# Patient Record
(demographics unavailable — no encounter records)

---

## 2024-12-09 NOTE — DISCUSSION/SUMMARY
[de-identified] : At this time, I recommended ice and elevation.  The patient was advised to return back to the office as needed for the right knee osteoarthritis.  We did talk about gel injections, so we might order them when she gives us a call and when the cortisone starts wearing off.

## 2024-12-09 NOTE — PHYSICAL EXAM
[de-identified] : Right Knee: Range of Motion in Degrees                     Claimant: Normal:  Flexion Active   135                  135-degrees  Flexion Passive   135                 135-degrees  Extension Active   0-5                 0-5-degrees  Extension Passive   0-5                 0-5-degrees    No weakness to flexion/extension. No evidence of instability in the AP plane or varus or valgus stress.  Negative Lachman.  Negative pivot shift.  Negative anterior drawer test.  Negative posterior drawer test.  Negative Fide.  Negative Apley grind.  No medial or lateral joint line tenderness.  Positive tenderness over the medial and lateral facet of the patella.  Positive patellofemoral crepitations.  No lateral tilting patella.  No patella apprehension.  Positive crepitation in the medial and lateral femoral condyle.  No proximal or distal swelling, edema or tenderness.  No gross motor or sensory deficits. Mild intra-articular swelling.  2+ DP and PT pulses. No varus or valgus malalignment.    [de-identified] : Gait and Station:  Ambulating with a slightly antalgic to antalgic gait.  Normal Station.  [de-identified] : Appearance:  Well-developed, well-nourished female in no acute distress.   [de-identified] : Radiographs, two views of the right knee taken in the office today, reveal moderate osteoarthritis.

## 2024-12-09 NOTE — PROCEDURE
[de-identified] :   Indication: Osteoarthritis of right knee   Consent: At this time, I have recommended an injection to the right knee.  The risks and benefits of the procedure were discussed with the patient in detail.  Upon verbal consent of the patient, we proceeded with the injection as noted below.   Description of Procedure: After a sterile prep, the patient underwent an injection of approximately 9 mL of 1% Lidocaine (10 mg/mL) without epinephrine and 1 mL of triamcinolone acetonide (40 mg/mL) into the right knee.  The patient tolerated the procedure well.  There were no complications.   : [employer] Drug Name: Triamcinolone Acetonide Injectable Suspension USP NDC#: [address]-8 Lot#:  [id number] Expiration Date: 08/31/25

## 2024-12-09 NOTE — ADDENDUM
[FreeTextEntry1] : This note was written by Daniel Savage on 12/09/2024, acting as a scribe for RICHARD CINTRON, BHASKAR/L, PA

## 2025-01-28 NOTE — HISTORY OF PRESENT ILLNESS
[FreeTextEntry1] : 58-year-old woman seen 03/15/2024 with complaint of abnormal odor and sediment in urine. She says she see crystals as well. She did not have storage symptoms or dysuria at the time. None now. She had UA (2/17/2024) bacteria, 3 WBCs, 1 cast but no other abnormalities, UCx showed low CFU enterococcus and Ecoli. PCP gave Rx for CIpro 250 mg, but no change in symptoms.  No hematuria, no dysuria, no frequency, no urgency, no hesitancy, no straining. No incontinence.  No fevers, no chills, no nausea, no vomiting, no flank pain. PVR 93 mL.  01/28/25: Patient presents for follow up. Reports she still has a large quantity of sediment/cloudy foul-smelling urine. Last CT (11/26/24) showed no stones or hydronephrosis. Cystoscopy (11/14/24) was negative. Kidney stone Urine Test (08/28/24) showed slightly elevated urine oxalate and calcium levels. She denies dysuria or recent UTI. Denies dysuria, hematuria, lower abdominal or flank pain, fever, chills or rigors. Denies hesitancy, straining, intermittency, urgency, incontinence or sense of incomplete emptying.

## 2025-01-28 NOTE — ASSESSMENT
[FreeTextEntry1] : 59-year-old female with crystalluria and microscopic hematuria. s/p negative hematuria workup 11/2024. Patient is without UTI symptoms. Will repeat urine studies. We discussed crystalluria does predict the development of macroscopic stone disease. For now, we discussed dietary medications for stone prevention. Patient was instructed to increase fluid intake to a urine output of 2L daily, reduce oxalate foods, and to reduce sodium intake.  Patient will RTO in 1 year or sooner if needed.

## 2025-02-13 NOTE — REVIEW OF SYSTEMS
[Muscle Pain] : muscle pain [Muscle Weakness] : muscle weakness [Difficulty Walking] : difficulty walking [Fever] : no fever [Chills] : no chills [Joint Pain] : no joint pain [Joint Stiffness] : no joint stiffness

## 2025-02-13 NOTE — PHYSICAL EXAM
[FreeTextEntry1] : Constitutional: In NAD, calm and cooperative HEENT: NCAT, Anicteric sclera, no lid-lag Cardio: Extremities appear pink and well perfused, no peripheral edema Respiratory: Normal respiratory effort on room air, no accessory muscle use Skin: no rashes seen on exposed skin, no visible abrasions Psych: Normal affect, intact judgment and insight Neuro: Awake, alert and oriented x 3, see below for focused neurological exam   MSK (Back)                 Inspection: muscle spasms of lumbar paraspinals on the left. Left quadricep spasm.                  Palpation: Tenderness of the left lower lumbar paraspinals                 ROM: Pain at end lumbar flexion > extension                 Strength: 5/5 strength in bilateral lower extremities except for 4+/5 left ankle dorsiflexion                 Reflexes: 2+ Patella reflex bilaterally, 1+ Achilles reflex bilaterally, negative clonus bilaterally                 Sensation: Intact to light touch in bilateral lower extremities                 Gait: antalgic gait. Able to toe walk. Unable to heel walk on the left   Special tests: SLR: positive on the left, reproduced radiating pain along the L4 and L5 dermatomes KRIS: negative bilaterally  FADIR: negative bilaterally  Facet loading: negative bilaterally  Thad's Finger: negative bilaterally

## 2025-02-13 NOTE — DATA REVIEWED
[Other: ___] : [unfilled] [Plain X-Rays] : plain X-Rays [FreeTextEntry1] : Confidential Drug Utilization Report Search Terms: Ida Johnson, 03/21/1965Search Date: 02/13/2025 10:15:22 AM The Drug Utilization Report below displays all of the controlled substance prescriptions, if any, that your patient has filled in the last twelve months. The information displayed on this report is compiled from pharmacy submissions to the Department, and accurately reflects the information as submitted by the pharmacies.  This report was requested by: Domingo Alford | Reference #: [id number]  Practitioner Count: 3 Pharmacy Count: 2 Current Opioid Prescriptions: 1 Current Benzodiazepine Prescriptions: 0 Current Stimulant Prescriptions: 1   Patient Demographic Information (PDI)     PDI	First Name	Last Name	Birth Date	Gender	Street Address	[facility] Code A	Ida Johnson	03/21/1965	Female	[address] 	[place] [address] B	Ida Johnson	03/21/1965	Female	[address] 	[facility]	[id number]  Prescription Information    PDI Filter:   PDI	My Rx	Current Rx	Drug Type	Rx Written	Rx Dispensed	Drug	Quantity	Days Supply	Prescriber Name	Prescriber FELISA # A	N	Y	O	01/30/2025	02/05/2025	buprenorphine-naloxone 2-0.5 mg sl film	45	15	Oneida Anton	[id number] Payment Method Medicaid [id number] A	N	N	O	12/19/2024	12/23/2024	buprenorphine-naloxone 4-1 mg sl film	30	30	Oneida Anton	[id number] Payment Method Medicaid Dispenser [id number] A	N	N	O	11/30/2024	11/30/2024	buprenorphine-naloxone 2-0.5 mg sl film	11	7	Tatiana Smith MD	[id number] Payment Method Medicaid Dispenser [id number] A	N	N	O	10/28/2024	10/28/2024	buprenorphine-naloxone 2-0.5 mg sl film	11	7	Oneida Anton	[id number] Payment Method Medicaid Dispenser [id number] A	N	N	B	09/26/2024	09/28/2024	diazepam 5 mg tablet	1	1	Santy Marcos MD	[id number] Payment Method Insurance [id number] A	N	N	O	09/26/2024	09/28/2024	buprenorphine-naloxone 2-0.5 mg sl film	45	30	Oneida Anton	[id number] Payment Method Insurance [id number] A	N	N	O	08/15/2024	08/26/2024	oxycodone-acetaminophen 5-325 mg tab	40	6	Grosskopf, Angie	[id number] Payment Method Medicaid [id number] A	N	N	O	07/19/2024	07/21/2024	buprenorphine-naloxone 4-1 mg sl film	15	10	[email or web address]	[id number] Payment Method Medicaid [id number] A	N	N	O	06/12/2024	06/19/2024	buprenorphine-naloxone 4-1 mg sl film	45	30	Oneida Anton	[id number] Payment Method Medicaid [id number] A	N	N	O	04/04/2024	04/11/2024	buprenorphine-naloxone 4-1 mg sl film	45	30	Oneida Anton	[id number] Payment Method Medicaid [id number] A	N	N	O	03/07/2024	03/09/2024	buprenorphine-naloxone 4-1 mg sl film	45	30	Jose Neal MD	[id number] Payment Method Medicaid [id number] A	N	N	O	02/19/2024	03/03/2024	oxycodone-acetaminophen 5-325 mg tab	40	6	Grosskopf, Angie	[id number] Payment Method Medicaid [id number] B	N	Y	S	02/08/2025	02/08/2025	phentermine 37.5 mg tablet	30	30	[facility]	[id number] Payment Method Cash Dispenser [facility] B	N	N	S	12/28/2024	12/28/2024	phentermine 37.5 mg tablet	30	30	[facility]	[id number] Payment Method Cash Dispenser [facility] B	N	N	S	05/21/2024	05/21/2024	phentermine 37.5 mg tablet	30	30	[facility]	[id number] Payment Method Cash Dispenser [facility] B	N	N	S	04/12/2024	04/12/2024	phentermine 37.5 mg tablet	30	30	Dahlia Fraser	[id number] Payment Method [place] [facility] CameronGilberto Dahlia B	N	N	S	02/20/2024	02/20/2024	phentermine 37.5 mg tablet	30	30	Hali, Starr LOUIE	[id number] Payment Method Cash Dispenser Hali Starr LOUIE  * - Details of Drug Type : O = Opioid, B = Benzodiazepine, S = Stimulant  * - Drugs marked with an asterisk are compound drugs. If the compound drug is made up of more than one controlled substance, then each controlled substance will be a separate row in the table.   **************** EXAM: XR HIP WITH PELV 2-3V LT  PROCEDURE DATE: 02/11/2025  INTERPRETATION: EXAM: XR HIP WITH PELVIS 2 OR 3 VIEWS LEFT INDICATION: Left lateral hip pain JXR COMPARISON: None  IMPRESSION: No acute fracture or dislocation. No significant arthritic changes. Follow-up suggested if indicated clinically. There may be some degenerative changes lower lumbar spine  --- End of Report ---  I personally reviewed the images for my patient and made my own independent interpretation separate from the official radiologist's report. My impression: mild degenerative changes seen on both hips. Possible degenerative changes seen on the lower lumbar and lumbosacral spine, however difficult to ascertain due to positioning

## 2025-02-13 NOTE — ASSESSMENT
[FreeTextEntry1] : NEAL SOLIS is a 59 year old female with a pertinent past medical history of NELLI on Suboxone, right knee OA s/p arthroscopy and meniscal repair who presents today for an evaluation of acute left leg pain.  The patient presents with acute left radiating leg pain of neuropathic and radicular in nature. It started about a week ago, after doing her usual physical activity/work out.. Went to ED was given Rx for ibuprofen and robaxin 2 days ago. Pain has been persistent since then    Their pain was refractory to conservative management with ibuprofen and muscle relaxants, stretching, ice/heat   Physical exam was notable for lumbar muscle spasms on the left, positive straight leg raise on the left along the L4 and L5 distribution, and left ankle dorsiflexion weakness.   Imaging studies shows mild degenerative changes seen on both hips. Possible degenerative changes seen on the lower lumbar and lumbosacral spine, however difficult to ascertain due to positioning. No Lumbosacral imaging were done in the ED.   Based on the patient's history, presentation, physical exam, and imaging findings, the patient's pain is most likely due to acute lumbar strain and acute lumbar radiculitis.     Plan: - Medication: >> Start Medrol dose pack as prescribed. >> Continue Robaxin 750mg but only take 1 tablet 4 times daily instead of 2 tablets 4 times daily as patient reported sedation on high dose of Robaxin. >> After finishing course of medrol, start Meloxicam 15mg po once daily with food for 4 weeks and then as needed thereafter. The patient is instructed to not take other NSAIDs such as ibuprofen, or naproxen while on this medication. >> Continue suboxone as prescribed by her NELLI treatment team.  >>Risk / benefits / alternatives discussed with patient.   - Physical Therapy: >> Patient may benefit from PT and supervised HEP, but will defer until her acute pain improves.   - Imaging: >> Will order Lumbar XR w/ Flex/Ext films to evaluate for potential source of lumbar radicular pain. >> Will order MRI lumbar spine to assess for evidence of lumbar disc herniation, central canal, and neuroforaminal stenosis resulting in left lumbar radiculopathy. MRI will be crucial for understanding potential restrictions for PT script as well as procedural planning if necessary.  - Red Flags: Patient is educated on the potential red flags for her condition, and to promptly go to the emergency room for an evaluation if present. They express understanding.   - Follow up in 2 weeks to monitor progress.

## 2025-02-13 NOTE — HISTORY OF PRESENT ILLNESS
[FreeTextEntry1] : NEAL SOLIS is a 59 year old female with a pertinent past medical history of NELLI on Suboxone, right knee OA s/p arthroscopy and meniscal repair who presents today for an evaluation of left leg pain.   Location: Left hip and buttock, radiates down all to the dorsal foot Onset: started acutely about a week ago, after doing her usual physical activity/work out.. Went to ED was given Rx for ibuprofen and robaxin Provocation/Palliative: worse with bending forward, although all activity in general makes it worse. Better when laying down flat on her foot Quality: burning, shooting, pins and needle Radiation: down the buttock to the dorsal left foot Severity: 8/10 currently, 10/10 at its worse Timing: constant Associated Sxs:   Denies associated numbness or tingling. Endorses left ankle weakness. Denies any loss of bowel/bladder control or any groin numbness.   Previous medications trialed: buprenorphine-naloxone 2-0.5 mg sl film #45 films  Current pain medications: ibuprofen, robaxin, and suboxone taper Previous procedures relevant to complaint: denies Has tried conservative treatment?: NSAIDs, muscle relaxant, stretching, ice/heat Imaging obtained: Left hip XR from the ED Anticoagulation?: none Allergies: NKDA Substance: NELLI on Suboxone. Was in NELLI rehab. Occupation:  Function:  independent with all ADLs and IADLs, ambulates without use of assistive device.

## 2025-02-27 NOTE — END OF VISIT
[FreeTextEntry3] : The time spent for this encounter includes time involved in providing services and care, including evaluation, diagnostic testing review, physical examination, review of the care plan with the patient.  The time spent also includes educating the patient on his disease pathology, prognosis, alternative treatments, and answering any questions the patient had. This time spent is independent from any time spent performing diagnostic and/or therapeutic procedures during this visit. This time spent is also independent from any time spent by a medical student, resident, or fellow for the encounter, if present. [Time Spent: ___ minutes] : I have spent [unfilled] minutes of time on the encounter which excludes teaching and separately reported services.

## 2025-02-27 NOTE — HISTORY OF PRESENT ILLNESS
[FreeTextEntry1] : NEAL SOLIS is a 59 year old female with a pertinent past medical history of NELLI on Suboxone, right knee OA s/p arthroscopy and meniscal repair who presents today for a follow up for left leg pain.   The assessment and plan from the last visit was:  ************************************************************************************************* Chronic low back pain, unspecified back pain laterality, unspecified whether sciatica present (724.2,338.29) (M54.50,G89.29) Left lumbar radiculitis (724.4) (M54.16) Acute lumbar myofascial strain (847.2) (S39.012A)  NEAL SOLIS is a 59 year old female with a pertinent past medical history of NELLI on Suboxone, right knee OA s/p arthroscopy and meniscal repair who presents today for an evaluation of acute left leg pain.  The patient presents with acute left radiating leg pain of neuropathic and radicular in nature. It started about a week ago, after doing her usual physical activity/work out.. Went to ED was given Rx for ibuprofen and robaxin 2 days ago. Pain has been persistent since then  Their pain was refractory to conservative management with ibuprofen and muscle relaxants, stretching, ice/heat  Physical exam was notable for lumbar muscle spasms on the left, positive straight leg raise on the left along the L4 and L5 distribution, and left ankle dorsiflexion weakness.  Imaging studies shows mild degenerative changes seen on both hips. Possible degenerative changes seen on the lower lumbar and lumbosacral spine, however difficult to ascertain due to positioning. No Lumbosacral imaging were done in the ED.  Based on the patient's history, presentation, physical exam, and imaging findings, the patient's pain is most likely due to acute lumbar strain and acute lumbar radiculitis.   Plan: - Medication: >> Start Medrol dose pack as prescribed. >> Continue Robaxin 750mg but only take 1 tablet 4 times daily instead of 2 tablets 4 times daily as patient reported sedation on high dose of Robaxin. >> After finishing course of medrol, start Meloxicam 15mg po once daily with food for 4 weeks and then as needed thereafter. The patient is instructed to not take other NSAIDs such as ibuprofen, or naproxen while on this medication. >> Continue suboxone as prescribed by her [facility] treatment team. >>Risk / benefits / alternatives discussed with patient.  - Physical Therapy: >> Patient may benefit from PT and supervised HEP, but will defer until her acute pain improves.  - Imaging: >> Will order Lumbar XR w/ Flex/Ext films to evaluate for potential source of lumbar radicular pain. >> Will order MRI lumbar spine to assess for evidence of lumbar disc herniation, central canal, and neuroforaminal stenosis resulting in left lumbar radiculopathy. MRI will be crucial for understanding potential restrictions for PT script as well as procedural planning if necessary.  - Red Flags: Patient is educated on the potential red flags for her condition, and to promptly go to the emergency room for an evaluation if present. They express understanding.  - Follow up in 2 weeks to monitor progress.   - Additionally, the patient had called twice regarding her acute pain on 02/19 and 02/20. Gabapentin and another Medrol pack were provided. Robaxin was renewed.   *************************************************************************************************  Since their last visit, they have tried two rounds of Medrol dose pack, Meloxicam, and gabapentin. Her acute pain has been too severe for her to participate in any meaningful physical therapy or exercises.   Reports associated numbness or tingling of the posterior leg all the way to the ankle. Endorses left ankle weakness. Denies any loss of bowel/bladder control or any groin numbness.  Previous medications trialed: buprenorphine-naloxone 2-0.5 mg sl film #45 films,  ibuprofen, robaxin, and suboxone taper, medrol, Current pain medications: Mobic 15mg, robaxin, gabapentin 300mg tid Previous procedures relevant to complaint: denies Has tried conservative treatment?: NSAIDs, muscle relaxant, stretching, ice/heat Imaging obtained: Left hip XR from the ED New images since last visit: XR lumbar spine 4 views, AP Lat Flex Exr Anticoagulation?: none Allergies: NKDA Substance: NELLI on Suboxone. Was in [facility] rehab.

## 2025-02-27 NOTE — REVIEW OF SYSTEMS
[Joint Pain] : joint pain [Joint Stiffness] : joint stiffness [Muscle Pain] : muscle pain [Muscle Weakness] : muscle weakness [Difficulty Walking] : difficulty walking [Fever] : no fever [Chills] : no chills

## 2025-02-27 NOTE — DATA REVIEWED
[Plain X-Rays] : plain X-Rays [FreeTextEntry1] : I personally reviewed the images for my patient and made my own independent interpretation separate from the official radiologist's report.  My impression: Levoscoliosis. There is a grade 1 anterolisthesis at L4-L5 and grade 1 retrolisthesis at L3-L4. Stable on Flex/Ex films. Multilevel loss of intervertebral disc height. L4-L5 facet arthrosis

## 2025-02-27 NOTE — ASSESSMENT
[FreeTextEntry1] : NEAL SOLIS is a 59 year old female with a pertinent past medical history of NELLI on Suboxone, right knee OA s/p arthroscopy and meniscal repair who presents today for a follow up for acute low back and left leg pain.   The patient presents with 1 month history of acute low back and left leg pain that is radicular in nature, with pins and needle, electrical sensation shooting down what appears to be L4 and L5 dermatome. There is associated numbness and tingling and weakness of the lower extremity.    Their pain was refractory to conservative management with meloxicam, Medrol dose pack x2, gabapentin, Robaxin, stretching, HEP, ice/heat. She has been in too much pain to participate in PT.    Physical exam was notable for positive straight leg raise on the left along the L4 and L5 distribution, and left ankle dorsiflexion weakness. There is also decreased sensation to Light touch on L5 dermatome which is new from the initial evaluate from weeks prior.   Imaging studies shows Levoscoliosis. There is a grade 1 anterolisthesis at L4-L5 and grade 1 retrolisthesis at L3-L4. Stable on Flex/Ex films. Multilevel loss of intervertebral disc height. L4-L5 facet arthrosis  Based on the patient's history, presentation, physical exam, and imaging findings, the patient's pain is most likely due to lumbar radiculitis secondary to lumbar DDD, lumbar spondylosis, and possibley compounded by the anterolithesis at L4-5.     Plan: - Medication: >> Continue Mobic 15mg once daily, Robaxin prn spasms, and gabapentin 300mg >>Risk / benefits / alternatives discussed with patient.   - Physical Therapy: >> Continue HEP as tolerated.   - Imaging: >> Will obtain MRI of Lumbar spine without contrast given persistent symptoms despite conservative management, new sensory symptoms on presentation and examination. MRI will also be vital for procedural planning and safety.   - Procedure: >> The patient is a good candidate for LESI or TFESI, but we will need the MRI to determine the target level(s) and for procedural planning.  >> Risk, benefits, and alternatives discussed.   - Red Flags: Patient is educated on the potential red flags for her condition, and to promptly go to the emergency room for an evaluation if present. They express understanding.   - We will call the patient after reviewing MRI and discuss further.

## 2025-02-27 NOTE — PHYSICAL EXAM
[FreeTextEntry1] : Constitutional: In NAD, calm and cooperative HEENT: NCAT, Anicteric sclera, no lid-lag Cardio: Extremities appear pink and well perfused, no peripheral edema Respiratory: Normal respiratory effort on room air, no accessory muscle use Skin: no rashes seen on exposed skin, no visible abrasions Psych: Normal affect, intact judgment and insight Neuro: Awake, alert and oriented x 3, see below for focused neurological exam  MSK (Back)  Inspection: muscle spasms of lumbar paraspinals on the left. Left quadricep spasm.  Palpation: Tenderness of the left lower lumbar paraspinals  ROM: Pain at end lumbar flexion > extension  Strength: 5/5 strength in bilateral lower extremities except for 4+/5 left ankle dorsiflexion  Reflexes: 2+ Patella reflex bilaterally, 1+ Achilles reflex bilaterally, negative clonus bilaterally  Sensation: Intact to light touch in bilateral lower extremities except newer decreased sensation to light touch along the left L5 dermatomal distribution  Gait: antalgic gait. Able to toe walk. Unable to heel walk on the left  Special tests: SLR: positive on the left, reproduced radiating pain along the L4 and L5 dermatomes KRIS: negative bilaterally FADIR: negative bilaterally Facet loading: negative bilaterally Thad's Finger: negative bilaterally.

## 2025-03-05 NOTE — REASON FOR VISIT
[Medical Office: ([facility])___] : at the medical office located in  [Verbal consent obtained from patient] : the patient, [unfilled] [Initial Evaluation] : an initial evaluation [Other Location: e.g. School (Enter Location, City,State)___] : at [unfilled], at the time of the visit.

## 2025-03-05 NOTE — DATA REVIEWED
[MRI] : MRI [FreeTextEntry1] : EXAM: [id number] - MR SPINE LUMBAR  - ORDERED BY: BELLA ZAYAS   PROCEDURE DATE:  02/28/2025    INTERPRETATION:  CLINICAL INFORMATION: Low back pain  ADDITIONAL CLINICAL INFORMATION: Not Applicable  TECHNIQUE: Multiplanar, multisequence MRI was performed of the lumbar spine. IV Contrast: NONE  PRIOR STUDIES: No priors available for comparison.  FINDINGS:  BONES: Endplate degenerative changes at L3-L4 compatible with Modic changes. Additional Modic changes at L1-L2. ALIGNMENT: Broad levocurvature. Left lateral subluxation and grade I anterolisthesis of L4 and L5. Right lateral subluxation of L1 on L2. Grade I anterolisthesis of L5 on S1. SACROILIAC JOINTS/SACRUM: There is no sacral fracture. The SI joints are partially visualized but are intact. CONUS AND CAUDA EQUINA: The distal cord and conus are normal in signal. Conus terminates at L1. VISUALIZED INTRAPELVIC/INTRA-ABDOMINAL SOFT TISSUES: Dilated common bile duct. Dilated pancreatic duct. Incompletely evaluated. PARASPINAL SOFT TISSUES: Normal.   INDIVIDUAL LEVELS: Multilevel loss of disc height and disc signal, severe L3-L4.  LOWER THORACIC SPINE: Disc bulge in the lower thoracic spine which indents the ventral thecal sac without significant spinal canal or foraminal narrowing, only evaluated in sagittal plane.  L1-L2: Disc bulge asymmetric to the right. Mild narrowing of the right lateral recess. Mild right foraminal narrowing. L2-L3: Disc bulge asymmetric to the right. Mild narrowing of the right lateral recess. Mild to moderate right greater than foraminal narrowing. L3-L4: Disc bulge asymmetric to the right with osseous ridging. Severe right and mild left foraminal narrowing. L4-L5: Grade I anterolisthesis with disc uncovering. Bulky bilateral facet arthrosis with effusions. Severe spinal canal stenosis. Left facet cyst within the neural foramen. Severe left greater than right foraminal narrowing. L5-S1: Disc bulge with bulky osseous ridging asymmetric to the left. Contact on the left L5 exited nerve. Severe bilateral facet arthrosis. Narrowing of the left greater than right lateral recess with contact on the S1 descending nerves. Moderate spinal canal stenosis. Mild-to-moderate right and moderate to severe left foraminal narrowing.   IMPRESSION:  *  Moderate to severe multilevel lumbar spondylosis, most notably at L4-L5 where there is grade I anterolisthesis, severe spinal canal stenosis and severe bilateral foraminal narrowing. *  Additional levels of spinal canal and foraminal narrowing. *  Dilated common bile and pancreatic ducts, incompletely evaluated. Right upper quadrant sonogram is recommended for further evaluation.  --- End of Report ---       JEN SPENCER MD; Attending Radiologist This document has been electronically signed. Mar  2 2025  9:27PM

## 2025-03-05 NOTE — ASSESSMENT
[FreeTextEntry1] : Ms. NEAL SOLIS is a 59 year female who presents with acute LBP with LLE radiculopathy. Pain has persisted despite adherence to PO medications, has been unable to start PT due to left foot drop and severe pain which is limiting her overall mobility and ability to perform her ADLs.     Impression: low back pain lumbar radiculopathy  Lumbar stenosis  Plan:  - PM&R notes reviewed  - MR L spine reviewed  - caudal NORA x1 ordered, no clearances needed.  - red flag sign/symptoms reviewed  - US duplex LLE ordered  - continue gabapentin and meloxicam as per Dr. Alford.  - RTC after injection    The patient expressed verbal understanding and is in agreement with the plan of care. All of the patient's questions and concerns were addressed during today's visit.

## 2025-04-23 NOTE — ADDENDUM
[FreeTextEntry1] : This note was written by Jeanine Molina on 04/23/2025 acting as scribe for Angie Anderson, OTR/L, PA

## 2025-04-23 NOTE — PHYSICAL EXAM
[de-identified] : Right Knee: Range of Motion in Degrees                                             Claimant:              Normal: Flexion Active:                        135                135-degrees Flexion Passive:                     135                135-degrees Extension Active:                    0-5                 0-5-degrees Extension Passive:                 0-5                 0-5-degrees  No weakness to flexion/extension. No evidence of instability in the AP plane or varus or valgus stress. Negative Lachman. Negative pivot shift.  Negative anterior drawer test.  Negative posterior drawer test. Negative Fide. Negative Apley grind.  No medial or lateral joint line tenderness.  Positive tenderness over the medial and lateral facet of the patella.  Positive patellofemoral crepitations.  No lateral tilting patella.  No patella apprehension.  Positive crepitation in the medial and lateral femoral condyle.  No proximal or distal swelling, edema or tenderness.  No gross motor or sensory deficits.  Mild intra-articular swelling. 2+ DP and PT pulses. No varus or valgus malalignment. Skin is intact. No rashes, scars or lesions.    [de-identified] : Ambulating with a slightly antalgic to antalgic gait.  Station:  Normal.  [de-identified] : Appearance:  Well-developed, well-nourished female in no acute distress.   [de-identified] : Review of radiographs from the previous visit, reveal mild to moderate osteoarthritis of the right knee.

## 2025-04-23 NOTE — DISCUSSION/SUMMARY
[de-identified] : At this time, due to osteoarthritis with a possible meniscus tear of the right knee, the patient will get an MRI to rule out a meniscus tear.

## 2025-04-23 NOTE — HISTORY OF PRESENT ILLNESS
[de-identified] : The patient comes in today with right knee pain. She states she can't even squat down fully, the knee is getting locked, and it is clicking and popping.

## 2025-04-25 NOTE — PROCEDURE
[de-identified] : Caudal Epidural Steroid Injection Under Fluoroscopic Guidance  Attending: Klarissa Clinton DO  PREOPERATIVE DIAGNOSIS: Lumbar Stenosis with lumbar radiculopathy  POSTOPERATIVE DIAGNOSIS: Lumbar Stenosis with lumbar radiculopathy   SEDATION: As per Anesthesia  PROCEDURE PERFORMED: Caudal Epidural Steroid Injection  ESTIMATED BLOOD LOSS: None  FLUOROSCOPY WAS USED.    PROCEDURE AND FINDINGS:  Patient was greeted in the pre-procedure holding area. The risk, benefits and alternatives to the procedure were again reviewed with the patient and written informed consent was placed in the chart. They were taken to the procedure room and positioned prone on the fluoroscopy table. Prior to the procedure a time out was completed, verifying correct patient, procedure, and site.   The skin was prepped with chlorhexidine and draped in the usual sterile fashion. An AP fluoroscopic  film was taken to identify the sacral hiatus. A lateral film was then obtained. The skin and subcutaneous tissue overlying the aforementioned anatomic targets were anesthetized using a 25-gauge 1-1/2 inch needle with 1% preservative-free lidocaine for a total volume of 3 mls.  Then a 22-gauge 1.5 inch Quincke spinal needle was advanced under fluoroscopic guidance into the sacral hiatus. After negative aspiration, 3 mls of contrast was injected under AP and lateral views and confirmed adequate spread along the nerve roots and in the epidural space. There was no evidence of intravascular uptake or intrathecal spread on imaging.  At this point, after negative aspiration, a total of 6mL volume of treatment injectate, consisting of 1 mL of depomedrol 40mg/mL, and 5 mL of Preservative Free Normal Saline was injected easily. The needle was then flushed with 1 ml of saline and removed. The needle insertion site was dressed appropriately.  DISPOSITION: Patient was taken to the recovery room where they were monitored for a brief period of time. They tolerated the procedure well and were discharged home in stable condition with post procedural instructions. Patient was instructed to follow up in 2 weeks.

## 2025-04-25 NOTE — PROCEDURE
[de-identified] : Caudal Epidural Steroid Injection Under Fluoroscopic Guidance  Attending: Klarissa Clinton DO  PREOPERATIVE DIAGNOSIS: Lumbar Stenosis with lumbar radiculopathy  POSTOPERATIVE DIAGNOSIS: Lumbar Stenosis with lumbar radiculopathy   SEDATION: As per Anesthesia  PROCEDURE PERFORMED: Caudal Epidural Steroid Injection  ESTIMATED BLOOD LOSS: None  FLUOROSCOPY WAS USED.    PROCEDURE AND FINDINGS:  Patient was greeted in the pre-procedure holding area. The risk, benefits and alternatives to the procedure were again reviewed with the patient and written informed consent was placed in the chart. They were taken to the procedure room and positioned prone on the fluoroscopy table. Prior to the procedure a time out was completed, verifying correct patient, procedure, and site.   The skin was prepped with chlorhexidine and draped in the usual sterile fashion. An AP fluoroscopic  film was taken to identify the sacral hiatus. A lateral film was then obtained. The skin and subcutaneous tissue overlying the aforementioned anatomic targets were anesthetized using a 25-gauge 1-1/2 inch needle with 1% preservative-free lidocaine for a total volume of 3 mls.  Then a 22-gauge 1.5 inch Quincke spinal needle was advanced under fluoroscopic guidance into the sacral hiatus. After negative aspiration, 3 mls of contrast was injected under AP and lateral views and confirmed adequate spread along the nerve roots and in the epidural space. There was no evidence of intravascular uptake or intrathecal spread on imaging.  At this point, after negative aspiration, a total of 6mL volume of treatment injectate, consisting of 1 mL of depomedrol 40mg/mL, and 5 mL of Preservative Free Normal Saline was injected easily. The needle was then flushed with 1 ml of saline and removed. The needle insertion site was dressed appropriately.  DISPOSITION: Patient was taken to the recovery room where they were monitored for a brief period of time. They tolerated the procedure well and were discharged home in stable condition with post procedural instructions. Patient was instructed to follow up in 2 weeks.

## 2025-07-24 NOTE — PHYSICAL EXAM
[de-identified] :  Right knee: Knee: Range of Motion in Degrees                                   Claimant:     Normal:  Flexion Active          135               135-degrees  Flexion Passive       135               135-degrees  Extension Active      0-5                0-5-degrees  Extension Passive   0-5                0-5-degrees    No weakness to flexion/extension. No evidence of instability in the AP plane or varus or valgus stress.  Negative Lachman.  Negative pivot shift.  Negative anterior drawer test.  Negative posterior drawer test.  Negative Fide.  Negative Apley grind.  No medial or lateral joint line tenderness.  Positive tenderness over the medial and lateral facet of the patella.  Positive patellofemoral crepitations.  No lateral tilting patella.  No patella apprehension.  Positive crepitation in the medial and lateral femoral condyle.  No proximal or distal swelling, edema or tenderness.  No gross motor or sensory deficits. Mild intra-articular swelling.  2+ DP and PT pulses. No varus or valgus malalignment.  Skin is intact.  No rashes, scars or lesions.     [de-identified] : Gait: Slightly antalgic to antalgic.  Station: Normal  [de-identified] : Appearance:  Well-developed, well-nourished female in no acute distress.

## 2025-07-24 NOTE — DISCUSSION/SUMMARY
[de-identified] : The patient presents with osteoarthritis, right knee.  At this time, I recommend she undergo a course of viscosupplementation.

## 2025-07-24 NOTE — DISCUSSION/SUMMARY
[de-identified] : The patient presents with osteoarthritis, right knee.  At this time, I recommend she undergo a course of viscosupplementation.

## 2025-07-24 NOTE — ADDENDUM
[FreeTextEntry1] : This note was written by Maxine Guzmán on 07/24/2025 acting as scribe for Mason Leon III, MD